# Patient Record
Sex: FEMALE | Race: WHITE | Employment: OTHER | URBAN - METROPOLITAN AREA
[De-identification: names, ages, dates, MRNs, and addresses within clinical notes are randomized per-mention and may not be internally consistent; named-entity substitution may affect disease eponyms.]

---

## 2020-06-11 ENCOUNTER — APPOINTMENT (OUTPATIENT)
Dept: CT IMAGING | Age: 75
End: 2020-06-11
Payer: MEDICARE

## 2020-06-11 ENCOUNTER — HOSPITAL ENCOUNTER (EMERGENCY)
Age: 75
Discharge: HOME | End: 2020-06-11
Attending: EMERGENCY MEDICINE
Payer: MEDICARE

## 2020-06-11 VITALS
WEIGHT: 197 LBS | DIASTOLIC BLOOD PRESSURE: 66 MMHG | BODY MASS INDEX: 39.72 KG/M2 | RESPIRATION RATE: 20 BRPM | HEIGHT: 59 IN | TEMPERATURE: 98.2 F | SYSTOLIC BLOOD PRESSURE: 144 MMHG | OXYGEN SATURATION: 95 % | HEART RATE: 86 BPM

## 2020-06-11 PROCEDURE — 99283 EMERGENCY DEPT VISIT LOW MDM: CPT

## 2020-06-11 PROCEDURE — 72125 CT NECK SPINE W/O DYE: CPT

## 2020-06-11 PROCEDURE — 70450 CT HEAD/BRAIN W/O DYE: CPT

## 2020-06-11 PROCEDURE — 12013 RPR F/E/E/N/L/M 2.6-5.0 CM: CPT

## 2020-06-11 NOTE — ED NOTES
Patient given discharge instructions verbal and written, patient verbalized understanding. Alert/oriented X4, Clear speech. Patient exhibits no distress, ambulates with steady gait per self leaving unit, no further request.  Brother in to transport home, nursing assist by wheel chair to [facility].      Geronimo Jones RN  06/11/20 [id number]

## 2020-06-11 NOTE — ED PROVIDER NOTES
EMERGENCY DEPARTMENT PHYSICIAN DOCUMENTATION      CHIEF COMPLAINT  LACERATION  Patient information was obtained from patient. History/Exam limitations: none. HISTORY OF PRESENT ILLNESS  Brenda Almeida is a 75 y.o. female with complaint of LACERATION. Injured area Forehead  Occurred last night 14 hours ago--tripped on way to bathroom struck head on toilet  No loc, + headache locally. Pain is sharp, worse with touching and moving, no sig radiation. Tdap is utd    REVIEW OF SYSTEMS  A full 10 point Review of Systems was performed and is negative aside from pertinent positives mentioned in HPI    ALLERGIES:  No Known Allergies    PAST HISTORY  Past Medical History:   Diagnosis Date    Chronic kidney disease     COPD (chronic obstructive pulmonary disease) ([address]     Depression     Diabetes mellitus (HCC)     GERD (gastroesophageal reflux disease)     Hypertension     Hypotension     MRSA (methicillin resistant staph aureus) culture positive 6/7/16    nares + by PCR    Weakness        History reviewed. No pertinent family history. No current facility-administered medications on file prior to encounter.       Current Outpatient Medications on File Prior to Encounter   Medication Sig Dispense Refill    magnesium oxide (MAG-OX) 400 (241.3 MG) MG TABS tablet Take 1 tablet by mouth 2 times daily 30 tablet 0    QUEtiapine (SEROQUEL) 25 MG tablet Take 1 tablet by mouth 3 times daily 60 tablet 3    potassium chloride 20 MEQ TBCR Take 20 mEq by mouth daily 60 tablet 3    amLODIPine (NORVASC) 10 MG tablet Take 1 tablet by mouth daily 30 tablet 0    albuterol (PROVENTIL) (2.5 MG/3ML) 0.083% nebulizer solution Take 2.5 mg by nebulization every 6 hours as needed for Wheezing      zolpidem (AMBIEN) 10 MG tablet Take by mouth nightly as needed for Sleep      citalopram (CELEXA) 20 MG tablet Take 20 mg by mouth daily      clonazePAM (KLONOPIN) 0.5 MG tablet Take 0.5 mg by mouth 2 times daily as needed  Ipratropium-Albuterol (DUONEB IN) Inhale 3 mLs into the lungs 4 times daily       triamterene-hydrochlorothiazide (MAXZIDE-25) 37.5-25 MG per tablet Take 1 tablet by mouth daily      Ferrous Sulfate Dried (FERROUS SULFATE CR PO) Take by mouth      folic acid (FOLVITE) 1 MG tablet Take 1 mg by mouth daily      gabapentin (NEURONTIN) 100 MG capsule Take 600 mg by mouth 3 times daily       loratadine (CLARITIN) 10 MG tablet Take 10 mg by mouth daily      metFORMIN (GLUCOPHAGE) 1000 MG tablet Take 1,000 mg by mouth 2 times daily (with meals)      montelukast (SINGULAIR) 10 MG tablet Take 10 mg by mouth nightly      pantoprazole sodium (PROTONIX) 40 MG PACK packet Take 40 mg by mouth every morning (before breakfast)      albuterol sulfate  (90 BASE) MCG/ACT inhaler Inhale 2 puffs into the lungs every 4 hours as needed for Wheezing      DICLOFENAC PO Take 75 mg by mouth 2 times daily       acetaminophen (TYLENOL) 650 MG suppository Place 1,000 mg rectally every 4 hours as needed for Fever         Social History     Tobacco Use    Smoking status: Never Smoker    Smokeless tobacco: Never Used   Substance Use Topics    Alcohol use: No    Drug use: Not Currently         EXAM:   Presentation Vital Signs: BP (!) 144/66   Pulse 86   Temp 98.2 °F (36.8 °C) (Oral)   Resp 20   Ht 4' 11\" (1.499 m)   Wt 89.4 kg (197 lb)   SpO2 95%   BMI 39.79 kg/m²     General: Well nourished, no acute distress  Head: No traumatic injury  ENT: MMM, no facial asymmetry, no nasal discharge  Eyes: EOM  Neck: no cspine tenderness  Lungs: No respiratory distress  Skin: forehead there is a 3cm laceration down to periosteum, with STS and ecchymoses surrounding. Clean, no contamination, discharge, induration, or fluctuance  Extremities: Normal ROM of bilateral upper extremities at shoulders, elbows, wrists; normal ROM of bilateral LE at hips and knees. Neurologic: Alert, oriented x 3.   No focal deficits upon moving arms